# Patient Record
(demographics unavailable — no encounter records)

---

## 2024-10-10 NOTE — HISTORY OF PRESENT ILLNESS
[FreeTextEntry6] : YESTERDAY AROUND 5 STARTED NOT FEELING WELL 101 TEMP TODAY  FEELS WEAK OVERALL  HEADACHES ON AND OFF  NOT EXPOSED TO STREP OR COVID THAT PT KNOWS OF   multiple sick contacts. multiple people pos for mycoplasma pneumonia  chart reviewed

## 2024-10-10 NOTE — DISCUSSION/SUMMARY
[FreeTextEntry1] : encourage your child to have 1 tsp of honey with warm water for throat pain  throat lozenges are ok for older children  throat cx pending. rapid neg.  discussed the costs/ benefits of RVP and Parent wanted it done.  f/u rvp results

## 2024-11-07 NOTE — HISTORY OF PRESENT ILLNESS
[Mother] : mother [Fruit] : fruit [Vegetables] : vegetables [Normal] : Normal [Brushing teeth twice/d] : brushing teeth twice per day [Yes] : Patient goes to dentist yearly [Participates in after-school activities] : participates in after-school activities [Grade ___] : Grade [unfilled] [No] : No cigarette smoke exposure [Appropriately restrained in motor vehicle] : appropriately restrained in motor vehicle [Wears helmet and pads] : wears helmet and pads [Monitored computer use] : monitored computer use [NO] : No [FreeTextEntry9] : wrestling kickboxing  [FreeTextEntry1] : PT IS HERE WITH MOM, HERE FOR 10 YR [place] -----> TDAP EATING SLEEPING POOPING NORMAL DOING WELL OVERALL   OAE: BOTH EARS PASSED  VISION: 20/20 BOTH EYES  U/A: PENDING

## 2024-11-07 NOTE — DISCUSSION/SUMMARY
[Anticipatory Guidance Given] : Anticipatory guidance addressed as per the history of present illness section [School] : school [Development and Mental Health] : development and mental health [Nutrition and Physical Activity] : nutrition and physical activity [Oral Health] : oral health [Safety] : safety [] : The components of the vaccine(s) to be administered today are listed in the plan of care. The disease(s) for which the vaccine(s) are intended to prevent and the risks have been discussed with the caretaker.  The risks are also included in the appropriate vaccination information statements which have been provided to the patient's caregiver.  The caregiver has given consent to vaccinate. [Mother] : mother [Not cleared] : Not cleared [de-identified] : needs CV clearance [FreeTextEntry1] : FH of early MI  denies shortness of breath and chest pain   needs CV clearance   declined flu

## 2024-11-07 NOTE — PHYSICAL EXAM
[Alert] : alert [No Acute Distress] : no acute distress [Normocephalic] : normocephalic [Conjunctivae with no discharge] : conjunctivae with no discharge [PERRL] : PERRL [EOMI Bilateral] : EOMI bilateral [Auricles Well Formed] : auricles well formed [Clear Tympanic membranes with present light reflex and bony landmarks] : clear tympanic membranes with present light reflex and bony landmarks [No Discharge] : no discharge [Nares Patent] : nares patent [Pink Nasal Mucosa] : pink nasal mucosa [Palate Intact] : palate intact [Nonerythematous Oropharynx] : nonerythematous oropharynx [Supple, full passive range of motion] : supple, full passive range of motion [No Palpable Masses] : no palpable masses [Symmetric Chest Rise] : symmetric chest rise [Clear to Auscultation Bilaterally] : clear to auscultation bilaterally [Regular Rate and Rhythm] : regular rate and rhythm [Normal S1, S2 present] : normal S1, S2 present [No Murmurs] : no murmurs [+2 Femoral Pulses] : +2 femoral pulses [Soft] : soft [NonTender] : non tender [Non Distended] : non distended [Normoactive Bowel Sounds] : normoactive bowel sounds [No Hepatomegaly] : no hepatomegaly [No Splenomegaly] : no splenomegaly [Pete: _____] : Pete [unfilled] [Testicles Descended Bilaterally] : testicles descended bilaterally [Patent] : patent [No fissures] : no fissures [No Abnormal Lymph Nodes Palpated] : no abnormal lymph nodes palpated [No Gait Asymmetry] : no gait asymmetry [No pain or deformities with palpation of bone, muscles, joints] : no pain or deformities with palpation of bone, muscles, joints [Normal Muscle Tone] : normal muscle tone [Straight] : straight [+2 Patella DTR] : +2 patella DTR [Cranial Nerves Grossly Intact] : cranial nerves grossly intact [No Rash or Lesions] : no rash or lesions

## 2025-02-06 NOTE — PHYSICAL EXAM
[Erythematous Oropharynx] : erythematous oropharynx [Enlarged Tonsils] : enlarged tonsils [Enlarged] : enlarged [Posterior Cervical] : posterior cervical [NL] : left tympanic membrane clear, right tympanic membrane clear

## 2025-02-06 NOTE — HISTORY OF PRESENT ILLNESS
[FreeTextEntry6] : PT IS 11YRS OLD, HERE WITH MOM PT IS HERE FOR SICK VISIT C/O FEVER, COUGH, CONGESTION, RUNNY NOSE & SORE THROAT  SYMPTOMS STARTED YESTERDAY  HIGHEST TEMP 101.6  MOM SAYS SHE NOTICED WHITE DOTS ON BOTH HANDS

## 2025-02-06 NOTE — DISCUSSION/SUMMARY
[FreeTextEntry1] : Counseled fully. PREWORK: Reviewed prior notes, reports, and results. Independent historian; parent.  Patient presents with parent for sick visit c/o fever up to 101.6F, cough, congestion, runny nose, and sore throat. Also, white dots on both hands.   RAPID STREP: Negative  PATIENT SWABBED IN OFFICE FOR THROAT CULTURE.  WILL F/U WITH RESULTS IN 48HRS.  Rapid Flu: POSITIVE Recommend supportive care including antipyretics, fluids, and nasal saline followed by nasal suction. Discussed risks/benefits of Tamiflu. RX TAMIFLU X5 DAYS   Advised to continue monitoring temperature and treat PRN with Tylenol or Motrin.  Ensure adequate hydration with Pedialyte or Gatorade. Keep patient comfortable.  Will be contagious until 24hrs fever free.   Symptoms likely due to viral URI. Recommend supportive care including antipyretics, fluids, and nasal saline followed by nasal suction. Return if symptoms worsen or persist.